# Patient Record
(demographics unavailable — no encounter records)

---

## 2024-12-04 NOTE — REASON FOR VISIT
[Initial Consultation] : an initial consultation for [Parent] : parent [Other: _____] : [unfilled] [FreeTextEntry2] : epistaxis

## 2024-12-04 NOTE — HISTORY OF PRESENT ILLNESS
[de-identified] : Has had occ nosebleeds.  Problem for past 2 weeks.  Usually left side.  No nose picking.  No  other hx of bleeding problems.  No ASA or blood thinners.  Last nosebleed several days ago.

## 2024-12-04 NOTE — ASSESSMENT
[FreeTextEntry1] : Patient here for occ left sided epistaxis - no bleeding site seen - has nasal crusting bilat - worse on left .  Recommended mupirocin ointment as directed and use of saline nasal spray. Discussed cool mist bedside humidifier.   Follow up as needed.

## 2024-12-04 NOTE — PHYSICAL EXAM
[Midline] : trachea located in midline position [Normal] : no rashes [de-identified] : mod nasal crusting bilat - worse on left - no bleeding site seen

## 2024-12-04 NOTE — REVIEW OF SYSTEMS
[Nose Bleeds] : nose bleeds [Problem Snoring] : problem snoring [Snoring With Pauses] : snoring with pauses [Negative] : Heme/Lymph [de-identified] : epistaxis since 2yrs old, mother says pt has recurring nosebleeds since her fall at 2 yrs, snoring with whistling sound/pauses

## 2024-12-04 NOTE — CONSULT LETTER
[Dear  ___] : Dear  [unfilled], [Consult Letter:] : I had the pleasure of evaluating your patient, [unfilled]. [Please see my note below.] : Please see my note below. [Consult Closing:] : Thank you very much for allowing me to participate in the care of this patient.  If you have any questions, please do not hesitate to contact me. [FreeTextEntry3] : Sincerely,  Rishi Albert MD., FACS